# Patient Record
Sex: MALE | Employment: UNEMPLOYED | URBAN - METROPOLITAN AREA
[De-identification: names, ages, dates, MRNs, and addresses within clinical notes are randomized per-mention and may not be internally consistent; named-entity substitution may affect disease eponyms.]

---

## 2021-12-18 ENCOUNTER — APPOINTMENT (OUTPATIENT)
Dept: GENERAL RADIOLOGY | Facility: CLINIC | Age: 46
End: 2021-12-18
Attending: EMERGENCY MEDICINE
Payer: OTHER GOVERNMENT

## 2021-12-18 ENCOUNTER — HOSPITAL ENCOUNTER (EMERGENCY)
Facility: CLINIC | Age: 46
Discharge: HOME | End: 2021-12-18
Attending: EMERGENCY MEDICINE | Admitting: EMERGENCY MEDICINE
Payer: OTHER GOVERNMENT

## 2021-12-18 VITALS
WEIGHT: 164 LBS | TEMPERATURE: 101.6 F | SYSTOLIC BLOOD PRESSURE: 116 MMHG | HEART RATE: 85 BPM | OXYGEN SATURATION: 97 % | DIASTOLIC BLOOD PRESSURE: 75 MMHG | RESPIRATION RATE: 43 BRPM

## 2021-12-18 DIAGNOSIS — Z20.822 SUSPECTED COVID-19 VIRUS INFECTION: ICD-10-CM

## 2021-12-18 DIAGNOSIS — U07.1 PNEUMONIA DUE TO 2019 NOVEL CORONAVIRUS: ICD-10-CM

## 2021-12-18 DIAGNOSIS — J12.82 PNEUMONIA DUE TO 2019 NOVEL CORONAVIRUS: ICD-10-CM

## 2021-12-18 LAB
FLUAV RNA SPEC QL NAA+PROBE: NEGATIVE
FLUBV RNA RESP QL NAA+PROBE: NEGATIVE
SARS-COV-2 RNA RESP QL NAA+PROBE: POSITIVE

## 2021-12-18 PROCEDURE — 87636 SARSCOV2 & INF A&B AMP PRB: CPT | Performed by: EMERGENCY MEDICINE

## 2021-12-18 PROCEDURE — 250N000013 HC RX MED GY IP 250 OP 250 PS 637: Performed by: EMERGENCY MEDICINE

## 2021-12-18 PROCEDURE — C9803 HOPD COVID-19 SPEC COLLECT: HCPCS

## 2021-12-18 PROCEDURE — 99284 EMERGENCY DEPT VISIT MOD MDM: CPT | Mod: 25

## 2021-12-18 PROCEDURE — 71045 X-RAY EXAM CHEST 1 VIEW: CPT

## 2021-12-18 RX ORDER — ACETAMINOPHEN 500 MG
1000 TABLET ORAL ONCE
Status: COMPLETED | OUTPATIENT
Start: 2021-12-18 | End: 2021-12-18

## 2021-12-18 RX ORDER — IBUPROFEN 600 MG/1
600 TABLET, FILM COATED ORAL ONCE
Status: COMPLETED | OUTPATIENT
Start: 2021-12-18 | End: 2021-12-18

## 2021-12-18 RX ADMIN — ACETAMINOPHEN 1000 MG: 500 TABLET, FILM COATED ORAL at 13:06

## 2021-12-18 RX ADMIN — IBUPROFEN 600 MG: 600 TABLET ORAL at 13:06

## 2021-12-18 ASSESSMENT — ENCOUNTER SYMPTOMS
COUGH: 1
FEVER: 0
SORE THROAT: 0
DIARRHEA: 0
SHORTNESS OF BREATH: 1
VOMITING: 0

## 2021-12-18 NOTE — ED PROVIDER NOTES
History   Chief Complaint:  Chest Pain, Back Pain     The history is provided by the patient.    was used for this patient's history and physical    Garry Wright is a 46 year old male presenting to the emergency department with generalized body aches a dry cough that is worse with deep breaths short of breath when he coughs. He is not Covid vaccinated. He lives by himself. Nobody else is sick that he is aware of. He does not smoke. He has not had any recent travel. He has decreased appetite but no vomiting or diarrhea. He denies any chest pain or abdominal pain. No sore throat. Urinating normally. He is not in any current medications..    Review of Systems   Constitutional: Negative for fever.   HENT: Negative for sore throat.    Respiratory: Positive for cough and shortness of breath.    Cardiovascular: Negative for chest pain.   Gastrointestinal: Negative for diarrhea and vomiting.   Musculoskeletal:        Myalgias   All other systems reviewed and are negative.        Allergies:  Penicillins    Medications:  The patient is not currently taking any daily medications.     Past Medical History:     The patient denies past medical history.      Past Surgical History:    The patient denies past surgical history.      Family History:    The patient denies past family history.     Social History:  Presents to ED alone.     Physical Exam     Patient Vitals for the past 24 hrs:   BP Temp Temp src Pulse Resp SpO2 Weight   12/18/21 1349 -- (!) 101.6  F (38.7  C) Oral 85 (!) 43 97 % --   12/18/21 1300 116/75 -- -- 89 27 97 % --   12/18/21 1227 131/73 (!) 102.9  F (39.4  C) Oral 92 29 97 % --   12/18/21 1146 -- -- -- -- -- -- 74.4 kg (164 lb)   12/18/21 1137 131/62 100.4  F (38  C) Oral 93 20 95 % --       Physical Exam    Physical Exam   Constitutional:  Patient is oriented to person, place, and time. They appear well-developed and well-nourished. Mild distress secondary to coughing   HENT:    Mouth/Throat:   Oropharynx is clear and moist.   Eyes:    Conjunctivae normal and EOM are normal. Pupils are equal, round, and reactive to light.   Neck:    Normal range of motion.   Cardiovascular: Normal rate, regular rhythm and normal heart sounds.  Exam reveals no gallop and no friction rub.  No murmur heard.  Pulmonary/Chest:  Effort normal and breath sounds normal. Patient has no wheezes. Patient has no rales.   Abdominal:   Soft. Bowel sounds are normal. Patient exhibits no mass. There is no tenderness. There is no rebound and no guarding.   Musculoskeletal:  Normal range of motion. Patient exhibits no edema.   Neurological:   Patient is alert and oriented to person, place, and time. Patient has normal strength. No cranial nerve deficit or sensory deficit. GCS 15.  Skin:   Skin is warm and dry. No rash noted. No erythema.   Psychiatric:   Patient has a normal mood    Emergency Department Course     Imaging:  XR Chest Port 1 View   Final Result   IMPRESSION: Patchy bilateral ground-glass opacities. No pneumothorax or pleural effusion.      Commonly reported imaging features of (COVID-19) pneumonia are present. Influenza pneumonia and organizing pneumonia as can be seen in the setting of drug toxicity and connective tissue disease can cause a similar imaging pattern.        Report per radiology    Laboratory:  Labs Ordered and Resulted from Time of ED Arrival to Time of ED Departure   INFLUENZA A/B & SARS-COV2 PCR MULTIPLEX - Abnormal       Result Value    Influenza A PCR Negative      Influenza B PCR Negative      SARS CoV2 PCR Positive (*)       Emergency Department Course:  Reviewed:  I reviewed nursing notes, vitals and past medical history.    Assessments:  1328 I obtained history and examined the patient as noted above.   1337 I rechecked the patient and explained findings.     Interventions:  1306 Tylenol 1000 mg, PO  1306 Ibuprofen 600 mg, PO    Disposition:  The patient was discharged to home.      Impression & Plan     Medical Decision Making:  Garry Wright is a 46-year-old gentleman presenting to the emergency department with symptoms consistent with Covid. He was swabbed for Covid and influenza and is positive for Covid. He is not hypoxic but he is febrile. We did give him Tylenol and ibuprofen here and he has no GI upset. Chest x-ray was performed that shows findings consistent with Covid pneumonia. I did ambulate him here with a portable pulse oximeter and he did fine. At this time I feel the patient is safe for discharge. I gave him the portable pulse oximeter and showed him how to use it, there was a little bit of discrepancy between our pulse oximeter at 98% and the pulse oximeter that read 93%. I therefore advised him to keep his oxygenation above 90% on this device and to check this every 4 hours. An order was placed for care coordination and get well loop. If his symptoms worsen he is to return to the emergency department.    Diagnosis:    ICD-10-CM    1. Pneumonia due to 2019 novel coronavirus  U07.1 COVID-19 GetWell Loop Referral    J12.82 Care Coordination Referral   2. Suspected COVID-19 virus infection  Z20.822      Scribe Disclosure:  I, Rowan Garnett, am serving as a scribe at 1:28 PM on 12/18/2021 to document services personally performed by Che Amato MD based on my observations and the provider's statements to me.      Che Amato MD  12/18/21 3070

## 2021-12-20 ENCOUNTER — PATIENT OUTREACH (OUTPATIENT)
Dept: CARE COORDINATION | Facility: CLINIC | Age: 46
End: 2021-12-20

## 2021-12-20 NOTE — PROGRESS NOTES
Clinic Care Coordination Contact    Care Coordination ED Discharge Follow up Note    Patient referred for Virtual Home Monitoring Program for COVID-19.     Called patient however call rang several times and no voicemail available.           URMILA BacaN, RN   [facility]  - [facility] Care Coordinator

## 2024-03-29 ENCOUNTER — HOSPITAL ENCOUNTER (EMERGENCY)
Facility: CLINIC | Age: 49
Discharge: HOME | End: 2024-03-29
Attending: PHYSICIAN ASSISTANT | Admitting: PHYSICIAN ASSISTANT

## 2024-03-29 ENCOUNTER — APPOINTMENT (OUTPATIENT)
Dept: GENERAL RADIOLOGY | Facility: CLINIC | Age: 49
End: 2024-03-29
Attending: PHYSICIAN ASSISTANT

## 2024-03-29 ENCOUNTER — APPOINTMENT (OUTPATIENT)
Dept: CT IMAGING | Facility: CLINIC | Age: 49
End: 2024-03-29
Attending: PHYSICIAN ASSISTANT

## 2024-03-29 VITALS
TEMPERATURE: 98.7 F | RESPIRATION RATE: 20 BRPM | HEART RATE: 52 BPM | OXYGEN SATURATION: 100 % | WEIGHT: 165 LBS | DIASTOLIC BLOOD PRESSURE: 63 MMHG | SYSTOLIC BLOOD PRESSURE: 123 MMHG

## 2024-03-29 DIAGNOSIS — S09.90XA CLOSED HEAD INJURY, INITIAL ENCOUNTER: ICD-10-CM

## 2024-03-29 DIAGNOSIS — S06.0X1A CONCUSSION WITH LOSS OF CONSCIOUSNESS OF 30 MINUTES OR LESS, INITIAL ENCOUNTER: ICD-10-CM

## 2024-03-29 DIAGNOSIS — M54.9 BACK PAIN: ICD-10-CM
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
## 2024-03-29 DIAGNOSIS — M54.2 NECK PAIN: ICD-10-CM

## 2024-03-29 PROCEDURE — 70450 CT HEAD/BRAIN W/O DYE: CPT

## 2024-03-29 PROCEDURE — 250N000013 HC RX MED GY IP 250 OP 250 PS 637: Performed by: PHYSICIAN ASSISTANT

## 2024-03-29 PROCEDURE — 72125 CT NECK SPINE W/O DYE: CPT

## 2024-03-29 PROCEDURE — 72072 X-RAY EXAM THORAC SPINE 3VWS: CPT

## 2024-03-29 PROCEDURE — 72100 X-RAY EXAM L-S SPINE 2/3 VWS: CPT

## 2024-03-29 PROCEDURE — 99285 EMERGENCY DEPT VISIT HI MDM: CPT | Mod: 25

## 2024-03-29 RX ORDER — ACETAMINOPHEN 325 MG/1
975 TABLET ORAL ONCE
Status: COMPLETED | OUTPATIENT
Start: 2024-03-29 | End: 2024-03-29

## 2024-03-29 RX ADMIN — ACETAMINOPHEN 975 MG: 325 TABLET, FILM COATED ORAL at 15:59

## 2024-03-29 ASSESSMENT — COLUMBIA-SUICIDE SEVERITY RATING SCALE - C-SSRS
2. HAVE YOU ACTUALLY HAD ANY THOUGHTS OF KILLING YOURSELF IN THE PAST MONTH?: NO
6. HAVE YOU EVER DONE ANYTHING, STARTED TO DO ANYTHING, OR PREPARED TO DO ANYTHING TO END YOUR LIFE?: NO
1. IN THE PAST MONTH, HAVE YOU WISHED YOU WERE DEAD OR WISHED YOU COULD GO TO SLEEP AND NOT WAKE UP?: NO

## 2024-03-29 ASSESSMENT — ACTIVITIES OF DAILY LIVING (ADL)
ADLS_ACUITY_SCORE: 35

## 2024-03-29 NOTE — ED TRIAGE NOTES
Fell 3 days ago and hit back of head on concrete, pt states he had a loc, no blood thinners, neck pain, c collar placed     Triage Assessment (Adult)       Row Name 03/29/24 2053          Triage Assessment    Airway WDL WDL        Respiratory WDL    Respiratory WDL WDL        Cardiac WDL    Cardiac WDL WDL        Cognitive/Neuro/Behavioral WDL    Cognitive/Neuro/Behavioral WDL WDL

## 2024-03-29 NOTE — DISCHARGE INSTRUCTIONS
For pain, you may take Tylenol 1000mg every 8 hours and ibuprofen 400mg every 6 hours for pain.  Ice areas of pain for 20 minutes every hour.

## 2024-03-29 NOTE — ED PROVIDER NOTES
History     Chief Complaint:  Fall, Head Injury, and Neck Pain    The history is provided by the patient.     Garry Wright is a 48 year old male presenting for evaluation of fall, head injury, and neck pain. Garry explains that he slipped and fell on his driveway three days ago, hitting his head on the ground. He states that he lost consciousness for four to five minutes and laid on the ground for another five minutes before getting up. He notes pain in his head, neck, bilateral shoulders, and back. He also notes headaches, vision changes, and feeling shaky. He reports use of ibuprofen for pain. He denies lightheadedness, dizziness, and chest pain preceding the fall. He denies recurrent loss of consciousness, numbness, paresthesias, leg pain, or any bleeding. He denies use of blood thinners.    Independent Historian:   The patient's relative interprets the above history.    Review of External Notes:   None    Medications:    The patient has no known daily or prescription medications.    Past Medical History:    The patient has no known pertinent past medical history.    Physical Exam   Patient Vitals for the past 24 hrs:   BP Temp Pulse Resp SpO2 Weight   03/29/24 1503 123/63 98.7  F (37.1  C) 52 20 100 % 74.8 kg (165 lb)     Physical Exam  Constitutional: Pleasant. Cooperative.   Eyes: Pupils equally round and reactive  HENT: No scalp hematoma. No scalp tenderness. No bony step-off or crepitus. No facial bone tenderness or instability. No periorbital ecchymosis or Meng signs. Oropharynx is normal with moist mucus membranes. No evidence for intraoral injury.  Cardiovascular: Regular rate and rhythm and without murmurs.  Respiratory: Normal respiratory effort, lungs are clear bilaterally.  GI: Abdomen is soft, non-tender, non-distended. No guarding, rebound, or rigidity.  Musculoskeletal: TTP to lower C spine in midline, lower T spine in midline, upper L spine in midline.  In c collar. No clavicular  tenderness. No upper extremity tenderness. No lower extremity tenderness. Normal ROM of extremities. No tenderness with compression of the rib cage or pelvis.  Skin: No rashes. No lacerations or abrasions noted.  Neurologic: Cranial nerves grossly intact, normal cognition, no focal deficits. Alert and oriented x 3.  GCS 15  Psychiatric: Normal affect.  Nursing notes and vital signs reviewed.    Emergency Department Course   Imaging:  Head CT w/o contrast   Final Result   IMPRESSION:  Normal head CT.         Radiation dose for this scan was reduced using automated exposure   control, adjustment of the mA and/or kV according to patient size, or   iterative reconstruction technique      VINNY PAN MD            SYSTEM ID:  [id number]      CT Cervical Spine w/o Contrast   Final Result   IMPRESSION: There is normal alignment of the cervical vertebrae;   however, there is reversal of normal cervical lordosis centered at the   C4 level. Vertebral body heights of the cervical spine are normal.   Craniocervical alignment is normal. There are no fractures of the   cervical spine. No spinal canal stenosis. No prevertebral soft tissue   swelling.         Radiation dose for this scan was reduced using automated exposure   control, adjustment of the mA and/or kV according to patient size, or   iterative reconstruction technique      VINNY PAN MD            SYSTEM ID:  [id number]      Thoracic spine XR, 3 views   Final Result   IMPRESSION: Left convex curvature centered in thoracolumbar junction.   No vertebral body height loss. No definite acute fracture or   subluxation. If there is high clinical suspicion for fracture   recommend CT exam. Multilevel degenerative changes with loss of disc   height and osteophyte formation.      MARIZOL RESENDIZ MD            SYSTEM ID:  [id number]      Lumbar spine XR, 2-3 views   Final Result   IMPRESSION: Left convex curvature centered in thoracolumbar junction.   No vertebral body height  loss. No definite acute fracture or   subluxation. If there is high clinical suspicion for fracture   recommend CT exam. Multilevel degenerative changes with loss of disc   height and osteophyte formation.      MARIZOL RESENDIZ MD            SYSTEM ID:  [id number]        Emergency Department Course & Assessments:    Interventions:  Medications   acetaminophen (TYLENOL) tablet 975 mg (975 mg Oral $Given 3/29/24 1553)     Assessments:  1550 I obtained history and examined the patient as noted above.  1709 I discussed findings and discharge with the patient. All questions answered.    Independent Interpretation (X-rays, CTs, rhythm strip):  I personally evaluated the lumbar XR, no obvious compression fractures noted.    Consultations/Discussion of Management or Tests:  None        Social Determinants of Health affecting care:   None    Disposition:  The patient was discharged.     Impression & Plan    MIPS (If applicable):  N/A    Medical Decision Making:  Garry Wright is a 48 year old male who presents to the ED for evaluation after a slip and fall on ice in which he struck his head and had LOC.  This occurred 3 days ago.  He has had ongoing headache and neck pain since that time.  See HPI as above for additional details.  Vitals and physical exam as above.  CT of head and C-spine returned negative for acute bony or intracranial abnormality.  X-rays of lower back negative for acute bony abnormality.  Certainly possible that patient had concussion.  No indication for any other imaging based upon full exam.  Discussed concussion/closed head injury precautions.  Discussed use of Tylenol and ibuprofen for pain.  Ice as needed.  Branch patient was safe for discharge to home. Discussed reasons to return. All questions answered. Patient discharged to home in stable condition.    Diagnosis:    ICD-10-CM    1. Fall, initial encounter  W19.XXXA       2. Closed head injury, initial encounter  S09.90XA       3. Concussion  with loss of consciousness of 30 minutes or less, initial encounter  S06.0X1A       4. Neck pain  M54.2       5. Back pain  M54.9            Discharge Medications:  New Prescriptions    No medications on file     Scribe Disclosure:  I, Deirdre Rodriguez, am serving as a scribe at 3:23 PM on 3/29/2024 to document services personally performed by Balwinder Barrett PA-C based on my observations and the provider's statements to me.   3/29/2024   Balwinder Barrett PA-C     This record was created at least in part using electronic voice recognition software, so please excuse any typographical errors.       Balwinder Barrett PA-C  03/29/24 [id number]

## 2024-05-22 ENCOUNTER — APPOINTMENT (OUTPATIENT)
Dept: ADMINISTRATIVE | Facility: CLINIC | Age: 49
End: 2024-05-22

## 2024-06-04 ENCOUNTER — APPOINTMENT (OUTPATIENT)
Dept: ADMINISTRATIVE | Facility: CLINIC | Age: 49
End: 2024-06-04